# Patient Record
Sex: MALE | Race: WHITE | NOT HISPANIC OR LATINO | Employment: FULL TIME | URBAN - METROPOLITAN AREA
[De-identification: names, ages, dates, MRNs, and addresses within clinical notes are randomized per-mention and may not be internally consistent; named-entity substitution may affect disease eponyms.]

---

## 2020-03-29 ENCOUNTER — TELEPHONE (OUTPATIENT)
Dept: URGENT CARE | Facility: CLINIC | Age: 35
End: 2020-03-29

## 2020-06-01 ENCOUNTER — APPOINTMENT (OUTPATIENT)
Dept: LAB | Facility: HOSPITAL | Age: 35
End: 2020-06-01

## 2020-06-01 ENCOUNTER — OFFICE VISIT (OUTPATIENT)
Dept: INTERNAL MEDICINE | Facility: CLINIC | Age: 35
End: 2020-06-01

## 2020-06-01 VITALS
DIASTOLIC BLOOD PRESSURE: 68 MMHG | OXYGEN SATURATION: 98 % | HEIGHT: 69 IN | TEMPERATURE: 97.2 F | BODY MASS INDEX: 23.11 KG/M2 | WEIGHT: 156 LBS | SYSTOLIC BLOOD PRESSURE: 114 MMHG | RESPIRATION RATE: 16 BRPM | HEART RATE: 74 BPM

## 2020-06-01 DIAGNOSIS — Z13.220 SCREENING FOR LIPID DISORDERS: ICD-10-CM

## 2020-06-01 DIAGNOSIS — Z13.1 SCREENING FOR DIABETES MELLITUS: ICD-10-CM

## 2020-06-01 DIAGNOSIS — Z13.29 SCREENING FOR THYROID DISORDER: ICD-10-CM

## 2020-06-01 DIAGNOSIS — Z00.00 VISIT FOR WELL MAN HEALTH CHECK: Primary | ICD-10-CM

## 2020-06-01 LAB
ALBUMIN SERPL-MCNC: 4.6 G/DL (ref 3.5–5.2)
ALBUMIN/GLOB SERPL: 2.2 G/DL
ALP SERPL-CCNC: 36 U/L (ref 39–117)
ALT SERPL W P-5'-P-CCNC: 16 U/L (ref 1–41)
ANION GAP SERPL CALCULATED.3IONS-SCNC: 12 MMOL/L (ref 5–15)
AST SERPL-CCNC: 28 U/L (ref 1–40)
BASOPHILS # BLD AUTO: 0.04 10*3/MM3 (ref 0–0.2)
BASOPHILS NFR BLD AUTO: 0.9 % (ref 0–1.5)
BILIRUB SERPL-MCNC: 0.8 MG/DL (ref 0.2–1.2)
BUN BLD-MCNC: 14 MG/DL (ref 6–20)
BUN/CREAT SERPL: 12.1 (ref 7–25)
CALCIUM SPEC-SCNC: 9.8 MG/DL (ref 8.6–10.5)
CHLORIDE SERPL-SCNC: 102 MMOL/L (ref 98–107)
CHOLEST SERPL-MCNC: 186 MG/DL (ref 0–200)
CO2 SERPL-SCNC: 28 MMOL/L (ref 22–29)
CREAT BLD-MCNC: 1.16 MG/DL (ref 0.76–1.27)
DEPRECATED RDW RBC AUTO: 38.8 FL (ref 37–54)
EOSINOPHIL # BLD AUTO: 0.28 10*3/MM3 (ref 0–0.4)
EOSINOPHIL NFR BLD AUTO: 6.6 % (ref 0.3–6.2)
ERYTHROCYTE [DISTWIDTH] IN BLOOD BY AUTOMATED COUNT: 11.9 % (ref 12.3–15.4)
GFR SERPL CREATININE-BSD FRML MDRD: 73 ML/MIN/1.73
GLOBULIN UR ELPH-MCNC: 2.1 GM/DL
GLUCOSE BLD-MCNC: 107 MG/DL (ref 65–99)
HBA1C MFR BLD: 4.7 % (ref 4.8–5.6)
HCT VFR BLD AUTO: 46.1 % (ref 37.5–51)
HDLC SERPL-MCNC: 46 MG/DL (ref 40–60)
HGB BLD-MCNC: 15.7 G/DL (ref 13–17.7)
IMM GRANULOCYTES # BLD AUTO: 0.01 10*3/MM3 (ref 0–0.05)
IMM GRANULOCYTES NFR BLD AUTO: 0.2 % (ref 0–0.5)
LDLC SERPL CALC-MCNC: 122 MG/DL (ref 0–100)
LDLC/HDLC SERPL: 2.66 {RATIO}
LYMPHOCYTES # BLD AUTO: 1.51 10*3/MM3 (ref 0.7–3.1)
LYMPHOCYTES NFR BLD AUTO: 35.7 % (ref 19.6–45.3)
MCH RBC QN AUTO: 30.4 PG (ref 26.6–33)
MCHC RBC AUTO-ENTMCNC: 34.1 G/DL (ref 31.5–35.7)
MCV RBC AUTO: 89.3 FL (ref 79–97)
MONOCYTES # BLD AUTO: 0.26 10*3/MM3 (ref 0.1–0.9)
MONOCYTES NFR BLD AUTO: 6.1 % (ref 5–12)
NEUTROPHILS # BLD AUTO: 2.13 10*3/MM3 (ref 1.7–7)
NEUTROPHILS NFR BLD AUTO: 50.5 % (ref 42.7–76)
NRBC BLD AUTO-RTO: 0 /100 WBC (ref 0–0.2)
PLATELET # BLD AUTO: 205 10*3/MM3 (ref 140–450)
PMV BLD AUTO: 10 FL (ref 6–12)
POTASSIUM BLD-SCNC: 4.3 MMOL/L (ref 3.5–5.2)
PROT SERPL-MCNC: 6.7 G/DL (ref 6–8.5)
RBC # BLD AUTO: 5.16 10*6/MM3 (ref 4.14–5.8)
SODIUM BLD-SCNC: 142 MMOL/L (ref 136–145)
T-UPTAKE NFR SERPL: 1.08 TBI (ref 0.8–1.3)
T4 SERPL-MCNC: 5.73 MCG/DL (ref 4.5–11.7)
TRIGL SERPL-MCNC: 88 MG/DL (ref 0–150)
TSH SERPL DL<=0.05 MIU/L-ACNC: 1.28 UIU/ML (ref 0.27–4.2)
VLDLC SERPL-MCNC: 17.6 MG/DL (ref 5–40)
WBC NRBC COR # BLD: 4.23 10*3/MM3 (ref 3.4–10.8)

## 2020-06-01 PROCEDURE — 80061 LIPID PANEL: CPT | Performed by: FAMILY MEDICINE

## 2020-06-01 PROCEDURE — 99385 PREV VISIT NEW AGE 18-39: CPT | Performed by: FAMILY MEDICINE

## 2020-06-01 PROCEDURE — 36415 COLL VENOUS BLD VENIPUNCTURE: CPT | Performed by: FAMILY MEDICINE

## 2020-06-01 PROCEDURE — 83036 HEMOGLOBIN GLYCOSYLATED A1C: CPT | Performed by: FAMILY MEDICINE

## 2020-06-01 PROCEDURE — 85025 COMPLETE CBC W/AUTO DIFF WBC: CPT | Performed by: FAMILY MEDICINE

## 2020-06-01 PROCEDURE — 84436 ASSAY OF TOTAL THYROXINE: CPT | Performed by: FAMILY MEDICINE

## 2020-06-01 PROCEDURE — 84443 ASSAY THYROID STIM HORMONE: CPT | Performed by: FAMILY MEDICINE

## 2020-06-01 PROCEDURE — 84479 ASSAY OF THYROID (T3 OR T4): CPT | Performed by: FAMILY MEDICINE

## 2020-06-01 PROCEDURE — 80053 COMPREHEN METABOLIC PANEL: CPT | Performed by: FAMILY MEDICINE

## 2020-06-01 PROCEDURE — 86900 BLOOD TYPING SEROLOGIC ABO: CPT | Performed by: FAMILY MEDICINE

## 2020-06-01 PROCEDURE — 86901 BLOOD TYPING SEROLOGIC RH(D): CPT | Performed by: FAMILY MEDICINE

## 2020-06-01 NOTE — PROGRESS NOTES
Subjective   Keon Guzman is a 35 y.o. male and is here for a comprehensive physical exam. The patient reports no problems.            Social History:   Social History     Socioeconomic History   • Marital status:      Spouse name: Not on file   • Number of children: Not on file   • Years of education: Not on file   • Highest education level: Not on file   Tobacco Use   • Smoking status: Never Smoker   • Smokeless tobacco: Never Used   Substance and Sexual Activity   • Alcohol use: Never     Frequency: Never       Family History: History reviewed. No pertinent family history.    Past Medical History: History reviewed. No pertinent past medical history.    The following portions of the patient's history were reviewed and updated as appropriate: allergies, current medications, past family history, past medical history, past social history, past surgical history and problem list.    Review of Systems    Review of Systems   Constitutional: Negative for chills and fever.   HENT: Negative for congestion, rhinorrhea, sinus pain and sore throat.    Eyes: Negative for photophobia and visual disturbance.   Respiratory: Negative for cough, chest tightness and shortness of breath.    Cardiovascular: Negative for chest pain and palpitations.   Gastrointestinal: Negative for diarrhea, nausea and vomiting.   Genitourinary: Negative for dysuria, frequency and urgency.   Skin: Negative for rash and wound.   Neurological: Negative for dizziness and syncope.   Psychiatric/Behavioral: Negative for behavioral problems and confusion.       Objective   Physical Exam   Constitutional: He is oriented to person, place, and time. He appears well-developed and well-nourished.   HENT:   Head: Normocephalic and atraumatic.   Right Ear: External ear normal.   Left Ear: External ear normal.   Mouth/Throat: Oropharynx is clear and moist.   Eyes: EOM are normal.   Neck: Normal range of motion. Neck supple.   Cardiovascular: Normal rate,  regular rhythm and normal heart sounds.   Pulmonary/Chest: Effort normal and breath sounds normal. No respiratory distress.   Abdominal: Soft. There is no tenderness. There is no guarding.   Musculoskeletal: Normal range of motion.   Lymphadenopathy:     He has no cervical adenopathy.   Neurological: He is alert and oriented to person, place, and time.   Skin: Skin is warm.   Psychiatric: He has a normal mood and affect. His behavior is normal.   Nursing note and vitals reviewed.      Vitals:    06/01/20 1239   BP: 114/68   Pulse: 74   Resp: 16   Temp: 97.2 °F (36.2 °C)   SpO2: 98%     Body mass index is 23.03 kg/m².    Medications: No current outpatient medications on file.       Assessment/Plan   Healthy male exam.      1. Healthcare Maintenance:  2. Patient Counseling:  --Nutrition: Stressed importance of moderation in sodium/caffeine intake, saturated fat and cholesterol, caloric balance, sufficient intake of fresh fruits, vegetables, fiber, calcium and vit D  --Exercise: Recommended 30 minutes of exercise daily.   --Immunizations reviewed.      Diagnoses and all orders for this visit:    Visit for well man health check  -     CBC & Differential  -     Comprehensive Metabolic Panel  -     ABO/Rh  -     CBC Auto Differential    Screening for diabetes mellitus  -     Hemoglobin A1c    Screening for thyroid disorder  -     Thyroid Panel With TSH    Screening for lipid disorders  -     Lipid Panel With LDL / HDL Ratio        No follow-ups on file.           Dictated utilizing Dragon Voice Recognition Software

## 2020-06-02 LAB
ABO GROUP BLD: NORMAL
RH BLD: POSITIVE

## 2020-06-02 NOTE — PROGRESS NOTES
The labs were reviewed. Please inform patient that labs were normal.  Let patient know that he has A+ blood type.  And that his labs look good.

## 2021-04-16 ENCOUNTER — BULK ORDERING (OUTPATIENT)
Dept: CASE MANAGEMENT | Facility: OTHER | Age: 36
End: 2021-04-16

## 2021-04-16 DIAGNOSIS — Z23 IMMUNIZATION DUE: ICD-10-CM

## 2021-04-21 ENCOUNTER — LAB (OUTPATIENT)
Dept: LAB | Facility: HOSPITAL | Age: 36
End: 2021-04-21

## 2021-04-21 ENCOUNTER — OFFICE VISIT (OUTPATIENT)
Dept: INTERNAL MEDICINE | Facility: CLINIC | Age: 36
End: 2021-04-21

## 2021-04-21 VITALS
HEIGHT: 69 IN | OXYGEN SATURATION: 99 % | HEART RATE: 66 BPM | SYSTOLIC BLOOD PRESSURE: 122 MMHG | BODY MASS INDEX: 24.35 KG/M2 | RESPIRATION RATE: 16 BRPM | DIASTOLIC BLOOD PRESSURE: 80 MMHG | TEMPERATURE: 98.1 F | WEIGHT: 164.4 LBS

## 2021-04-21 DIAGNOSIS — Z13.29 SCREENING FOR THYROID DISORDER: ICD-10-CM

## 2021-04-21 DIAGNOSIS — Z13.220 SCREENING FOR LIPID DISORDERS: ICD-10-CM

## 2021-04-21 DIAGNOSIS — Z13.1 SCREENING FOR DIABETES MELLITUS: ICD-10-CM

## 2021-04-21 DIAGNOSIS — Z00.00 VISIT FOR WELL MAN HEALTH CHECK: Primary | ICD-10-CM

## 2021-04-21 DIAGNOSIS — Z00.00 HEALTHCARE MAINTENANCE: ICD-10-CM

## 2021-04-21 LAB
25(OH)D3 SERPL-MCNC: 58.2 NG/ML (ref 30–100)
ALBUMIN SERPL-MCNC: 4.7 G/DL (ref 3.5–5.2)
ALBUMIN/GLOB SERPL: 1.9 G/DL
ALP SERPL-CCNC: 38 U/L (ref 39–117)
ALT SERPL W P-5'-P-CCNC: 29 U/L (ref 1–41)
ANION GAP SERPL CALCULATED.3IONS-SCNC: 10.7 MMOL/L (ref 5–15)
AST SERPL-CCNC: 40 U/L (ref 1–40)
BASOPHILS # BLD AUTO: 0.04 10*3/MM3 (ref 0–0.2)
BASOPHILS NFR BLD AUTO: 0.7 % (ref 0–1.5)
BILIRUB SERPL-MCNC: 1 MG/DL (ref 0–1.2)
BUN SERPL-MCNC: 24 MG/DL (ref 6–20)
BUN/CREAT SERPL: 19.5 (ref 7–25)
CALCIUM SPEC-SCNC: 10 MG/DL (ref 8.6–10.5)
CHLORIDE SERPL-SCNC: 100 MMOL/L (ref 98–107)
CHOLEST SERPL-MCNC: 244 MG/DL (ref 0–200)
CO2 SERPL-SCNC: 28.3 MMOL/L (ref 22–29)
CREAT SERPL-MCNC: 1.23 MG/DL (ref 0.76–1.27)
DEPRECATED RDW RBC AUTO: 38.1 FL (ref 37–54)
EOSINOPHIL # BLD AUTO: 0.25 10*3/MM3 (ref 0–0.4)
EOSINOPHIL NFR BLD AUTO: 4.3 % (ref 0.3–6.2)
ERYTHROCYTE [DISTWIDTH] IN BLOOD BY AUTOMATED COUNT: 12 % (ref 12.3–15.4)
GFR SERPL CREATININE-BSD FRML MDRD: 67 ML/MIN/1.73
GLOBULIN UR ELPH-MCNC: 2.5 GM/DL
GLUCOSE SERPL-MCNC: 80 MG/DL (ref 65–99)
HBA1C MFR BLD: 4.52 % (ref 4.8–5.6)
HCT VFR BLD AUTO: 49.7 % (ref 37.5–51)
HCV AB SER DONR QL: NORMAL
HDLC SERPL-MCNC: 68 MG/DL (ref 40–60)
HGB BLD-MCNC: 16.6 G/DL (ref 13–17.7)
IMM GRANULOCYTES # BLD AUTO: 0.02 10*3/MM3 (ref 0–0.05)
IMM GRANULOCYTES NFR BLD AUTO: 0.3 % (ref 0–0.5)
LDLC SERPL CALC-MCNC: 164 MG/DL (ref 0–100)
LDLC/HDLC SERPL: 2.37 {RATIO}
LYMPHOCYTES # BLD AUTO: 1.55 10*3/MM3 (ref 0.7–3.1)
LYMPHOCYTES NFR BLD AUTO: 26.4 % (ref 19.6–45.3)
MCH RBC QN AUTO: 30.1 PG (ref 26.6–33)
MCHC RBC AUTO-ENTMCNC: 33.4 G/DL (ref 31.5–35.7)
MCV RBC AUTO: 90.2 FL (ref 79–97)
MONOCYTES # BLD AUTO: 0.45 10*3/MM3 (ref 0.1–0.9)
MONOCYTES NFR BLD AUTO: 7.7 % (ref 5–12)
NEUTROPHILS NFR BLD AUTO: 3.56 10*3/MM3 (ref 1.7–7)
NEUTROPHILS NFR BLD AUTO: 60.6 % (ref 42.7–76)
NRBC BLD AUTO-RTO: 0 /100 WBC (ref 0–0.2)
PLATELET # BLD AUTO: 251 10*3/MM3 (ref 140–450)
PMV BLD AUTO: 9.7 FL (ref 6–12)
POTASSIUM SERPL-SCNC: 4.7 MMOL/L (ref 3.5–5.2)
PROT SERPL-MCNC: 7.2 G/DL (ref 6–8.5)
RBC # BLD AUTO: 5.51 10*6/MM3 (ref 4.14–5.8)
SODIUM SERPL-SCNC: 139 MMOL/L (ref 136–145)
T-UPTAKE NFR SERPL: 1.04 TBI (ref 0.8–1.3)
T4 SERPL-MCNC: 6.04 MCG/DL (ref 4.5–11.7)
TRIGL SERPL-MCNC: 74 MG/DL (ref 0–150)
TSH SERPL DL<=0.05 MIU/L-ACNC: 2.12 UIU/ML (ref 0.27–4.2)
VLDLC SERPL-MCNC: 12 MG/DL (ref 5–40)
WBC # BLD AUTO: 5.87 10*3/MM3 (ref 3.4–10.8)

## 2021-04-21 PROCEDURE — 36415 COLL VENOUS BLD VENIPUNCTURE: CPT | Performed by: FAMILY MEDICINE

## 2021-04-21 PROCEDURE — 84443 ASSAY THYROID STIM HORMONE: CPT | Performed by: FAMILY MEDICINE

## 2021-04-21 PROCEDURE — 84436 ASSAY OF TOTAL THYROXINE: CPT | Performed by: FAMILY MEDICINE

## 2021-04-21 PROCEDURE — 83036 HEMOGLOBIN GLYCOSYLATED A1C: CPT | Performed by: FAMILY MEDICINE

## 2021-04-21 PROCEDURE — 85025 COMPLETE CBC W/AUTO DIFF WBC: CPT | Performed by: FAMILY MEDICINE

## 2021-04-21 PROCEDURE — 86803 HEPATITIS C AB TEST: CPT | Performed by: FAMILY MEDICINE

## 2021-04-21 PROCEDURE — 80061 LIPID PANEL: CPT | Performed by: FAMILY MEDICINE

## 2021-04-21 PROCEDURE — 84479 ASSAY OF THYROID (T3 OR T4): CPT | Performed by: FAMILY MEDICINE

## 2021-04-21 PROCEDURE — 80053 COMPREHEN METABOLIC PANEL: CPT | Performed by: FAMILY MEDICINE

## 2021-04-21 PROCEDURE — 82306 VITAMIN D 25 HYDROXY: CPT | Performed by: FAMILY MEDICINE

## 2021-04-21 PROCEDURE — 99395 PREV VISIT EST AGE 18-39: CPT | Performed by: FAMILY MEDICINE

## 2021-04-21 RX ORDER — FEXOFENADINE HCL 180 MG/1
180 TABLET ORAL DAILY
COMMUNITY
End: 2022-08-15

## 2021-04-21 RX ORDER — FLUTICASONE PROPIONATE 50 MCG
2 SPRAY, SUSPENSION (ML) NASAL DAILY
COMMUNITY
End: 2022-08-15

## 2021-04-21 NOTE — PROGRESS NOTES
Subjective   Keon Guzman is a 36 y.o. male and is here for a comprehensive physical exam. The patient reports no problems.            Social History:   Social History     Socioeconomic History   • Marital status:      Spouse name: Not on file   • Number of children: Not on file   • Years of education: Not on file   • Highest education level: Not on file   Tobacco Use   • Smoking status: Never Smoker   • Smokeless tobacco: Never Used   Vaping Use   • Vaping Use: Never used   Substance and Sexual Activity   • Alcohol use: Never   • Drug use: Never       Family History: No family history on file.    Past Medical History: No past medical history on file.    The following portions of the patient's history were reviewed and updated as appropriate: allergies, current medications, past family history, past medical history, past social history, past surgical history and problem list.    Review of Systems    Review of Systems   Constitutional: Negative for chills and fever.   HENT: Positive for postnasal drip and sore throat. Negative for congestion, rhinorrhea and sinus pain.    Eyes: Negative for photophobia and visual disturbance.   Respiratory: Negative for cough, chest tightness and shortness of breath.    Cardiovascular: Negative for chest pain and palpitations.   Gastrointestinal: Negative for diarrhea, nausea and vomiting.   Genitourinary: Negative for dysuria, frequency and urgency.   Skin: Negative for rash and wound.   Neurological: Negative for dizziness and syncope.   Psychiatric/Behavioral: Negative for behavioral problems and confusion.       Objective   Physical Exam  Vitals and nursing note reviewed.   Constitutional:       Appearance: He is well-developed.   HENT:      Head: Normocephalic and atraumatic.      Right Ear: External ear normal.      Left Ear: External ear normal.   Cardiovascular:      Rate and Rhythm: Normal rate and regular rhythm.      Heart sounds: Normal heart sounds.    Pulmonary:      Effort: Pulmonary effort is normal. No respiratory distress.      Breath sounds: Normal breath sounds.   Abdominal:      Palpations: Abdomen is soft.      Tenderness: There is no abdominal tenderness. There is no guarding.   Musculoskeletal:         General: Normal range of motion.      Cervical back: Normal range of motion and neck supple.   Lymphadenopathy:      Cervical: No cervical adenopathy.   Skin:     General: Skin is warm.   Neurological:      Mental Status: He is alert and oriented to person, place, and time.   Psychiatric:         Behavior: Behavior normal.         Vitals:    04/21/21 1028   BP: 122/80   Pulse: 66   Resp: 16   Temp: 98.1 °F (36.7 °C)   SpO2: 99%     Body mass index is 24.28 kg/m².    Medications:   Current Outpatient Medications:   •  fexofenadine (Allegra Allergy) 180 MG tablet, Take 180 mg by mouth Daily., Disp: , Rfl:   •  fluticasone (FLONASE) 50 MCG/ACT nasal spray, 2 sprays into the nostril(s) as directed by provider Daily., Disp: , Rfl:        Assessment/Plan   Healthy male exam.      1. Healthcare Maintenance:  2. Patient Counseling:  --Nutrition: Stressed importance of moderation in sodium/caffeine intake, saturated fat and cholesterol, caloric balance, sufficient intake of fresh fruits, vegetables, fiber, calcium and vit D  --Exercise: Recommended 30 minutes of exercise daily.   --Immunizations reviewed.      Diagnoses and all orders for this visit:    Visit for well man health check    Screening for thyroid disorder    Screening for diabetes mellitus    Screening for lipid disorders    Healthcare maintenance  -     CBC & Differential  -     Comprehensive Metabolic Panel  -     Hemoglobin A1c  -     Thyroid Panel With TSH  -     Lipid Panel With LDL / HDL Ratio  -     Vitamin D 25 Hydroxy  -     Hepatitis C Antibody    Other orders  -     fexofenadine (Allegra Allergy) 180 MG tablet; Take 180 mg by mouth Daily.  -     fluticasone (FLONASE) 50 MCG/ACT nasal  spray; 2 sprays into the nostril(s) as directed by provider Daily.        No follow-ups on file.           Dictated utilizing Dragon Voice Recognition Software

## 2021-04-22 NOTE — PROGRESS NOTES
Please inform the patient of the following abnormal results. Cholesterol is high, needs to diet and exercise.

## 2021-10-12 ENCOUNTER — OFFICE VISIT (OUTPATIENT)
Dept: INTERNAL MEDICINE | Facility: CLINIC | Age: 36
End: 2021-10-12

## 2021-10-12 VITALS
OXYGEN SATURATION: 99 % | HEART RATE: 80 BPM | WEIGHT: 170.2 LBS | HEIGHT: 69 IN | BODY MASS INDEX: 25.21 KG/M2 | DIASTOLIC BLOOD PRESSURE: 60 MMHG | SYSTOLIC BLOOD PRESSURE: 106 MMHG

## 2021-10-12 DIAGNOSIS — M54.42 CHRONIC LEFT-SIDED LOW BACK PAIN WITH LEFT-SIDED SCIATICA: ICD-10-CM

## 2021-10-12 DIAGNOSIS — M77.11 LATERAL EPICONDYLITIS OF RIGHT ELBOW: Primary | ICD-10-CM

## 2021-10-12 DIAGNOSIS — G89.29 CHRONIC LEFT-SIDED LOW BACK PAIN WITH LEFT-SIDED SCIATICA: ICD-10-CM

## 2021-10-12 PROCEDURE — 99214 OFFICE O/P EST MOD 30 MIN: CPT | Performed by: FAMILY MEDICINE

## 2021-10-12 RX ORDER — CYCLOBENZAPRINE HCL 10 MG
10 TABLET ORAL 3 TIMES DAILY PRN
Qty: 60 TABLET | Refills: 2 | Status: SHIPPED | OUTPATIENT
Start: 2021-10-12 | End: 2022-08-15

## 2021-10-12 RX ORDER — DICLOFENAC SODIUM 20 MG/G
1 SOLUTION TOPICAL 2 TIMES DAILY PRN
Qty: 112 G | Refills: 11 | Status: SHIPPED | OUTPATIENT
Start: 2021-10-12 | End: 2022-08-15

## 2021-10-12 RX ORDER — MELOXICAM 10 MG/1
1 CAPSULE ORAL DAILY
Qty: 30 CAPSULE | Refills: 3 | Status: SHIPPED | OUTPATIENT
Start: 2021-10-12 | End: 2021-10-15

## 2021-10-12 NOTE — PROGRESS NOTES
"Chief Complaint  low back pain for about 3 months    Subjective          Keon Guzman presents to [facility]  History of Present Illness    Patient presents at today's office visit for a 3-month history of having lateral epicondylitis of the right elbow.  He does note that he is left-handed.  He does note that he exercises and works out regularly.  He does not know any particular activities that he could have done that could have triggered this.  However he is tender upon the epicondyle.    Patient also notes that he has some back pain.  Patient states that the pain has been on the low back, with some sciatica pain that radiates down his left buttock and left leg.  He denies any particular typically trigger this.  He does not exercise regularly.  He states this also been going on for almost 3 months history as well.  He does not believe in any particular activity could have triggered both of the pains that he currently has with the elbow along with the back.  He states that he recently just finished a steroid Dosepak, however he did not get full recovery of either one of his conditions.    Objective   Vital Signs:   /60 (BP Location: Left arm, Patient Position: Sitting, Cuff Size: Large Adult)   Pulse 80   Ht 175.3 cm (69\")   Wt 77.2 kg (170 lb 3.2 oz)   SpO2 99%   BMI 25.13 kg/m²     Physical Exam  Vitals and nursing note reviewed.   Constitutional:       Appearance: He is well-developed.   HENT:      Head: Normocephalic and atraumatic.   Musculoskeletal:        Arms:       Cervical back: Normal range of motion and neck supple.        Back:    Neurological:      Mental Status: He is alert and oriented to person, place, and time.   Psychiatric:         Behavior: Behavior normal.        Result Review :                 Assessment and Plan    Diagnoses and all orders for this visit:    1. Lateral epicondylitis of right elbow (Primary)  -     Diclofenac Sodium (Pennsaid) 2 " % solution; Apply 1 application topically 2 (Two) Times a Day As Needed (pain).  Dispense: 112 g; Refill: 11    2. Chronic left-sided low back pain with left-sided sciatica  -     Meloxicam 10 MG capsule; Take 1 tablet by mouth Daily.  Dispense: 30 capsule; Refill: 3  -     cyclobenzaprine (FLEXERIL) 10 MG tablet; Take 1 tablet by mouth 3 (Three) Times a Day As Needed for Muscle Spasms.  Dispense: 60 tablet; Refill: 2      Start patient on meloxicam.  We will also start him on Flexeril.  He can also do Pennsaid for the lateral epicondylitis.  He was signs symptoms do not improve, may benefit from seeing sports medicine orthopedics.  Also discussed with him about lifestyle modifying activities.      Follow Up   No follow-ups on file.  Patient was given instructions and counseling regarding his condition or for health maintenance advice. Please see specific information pulled into the AVS if appropriate.

## 2021-10-14 DIAGNOSIS — M54.42 CHRONIC LEFT-SIDED LOW BACK PAIN WITH LEFT-SIDED SCIATICA: ICD-10-CM

## 2021-10-14 DIAGNOSIS — G89.29 CHRONIC LEFT-SIDED LOW BACK PAIN WITH LEFT-SIDED SCIATICA: ICD-10-CM

## 2021-10-15 RX ORDER — MELOXICAM 10 MG/1
CAPSULE ORAL
Qty: 30 CAPSULE | Refills: 3 | Status: SHIPPED | OUTPATIENT
Start: 2021-10-15 | End: 2022-08-15

## 2021-11-15 ENCOUNTER — OFFICE VISIT (OUTPATIENT)
Dept: INTERNAL MEDICINE | Facility: CLINIC | Age: 36
End: 2021-11-15

## 2021-11-15 VITALS
WEIGHT: 174.9 LBS | HEART RATE: 94 BPM | RESPIRATION RATE: 16 BRPM | DIASTOLIC BLOOD PRESSURE: 74 MMHG | TEMPERATURE: 97.1 F | BODY MASS INDEX: 25.9 KG/M2 | HEIGHT: 69 IN | OXYGEN SATURATION: 99 % | SYSTOLIC BLOOD PRESSURE: 126 MMHG

## 2021-11-15 DIAGNOSIS — M54.42 CHRONIC LEFT-SIDED LOW BACK PAIN WITH LEFT-SIDED SCIATICA: Primary | ICD-10-CM

## 2021-11-15 DIAGNOSIS — G89.29 CHRONIC LEFT-SIDED LOW BACK PAIN WITH LEFT-SIDED SCIATICA: Primary | ICD-10-CM

## 2021-11-15 DIAGNOSIS — M77.11 LATERAL EPICONDYLITIS OF RIGHT ELBOW: ICD-10-CM

## 2021-11-15 PROCEDURE — 99214 OFFICE O/P EST MOD 30 MIN: CPT | Performed by: FAMILY MEDICINE

## 2021-11-15 NOTE — PROGRESS NOTES
"Chief Complaint  Back Pain and Elbow Pain    Subjective          Keon Guzman presents to [facility]  History of Present Illness    Patient still has some right elbow pain.  Patient states that if he lays off doing any work, he does feel like is getting better.  He will occasionally use the pennsaid.  We discussed at today's office visit that he may benefit from seeing orthopedist.    Patient continues to have back pain, but does note that has gotten slightly better.  He stopped taking Flexeril due to constipation.  So location takes meloxicam as needed.    Objective   Vital Signs:   /74   Pulse 94   Temp 97.1 °F (36.2 °C) (Infrared)   Resp 16   Ht 175.3 cm (69\")   Wt 79.3 kg (174 lb 14.4 oz)   SpO2 99%   BMI 25.83 kg/m²     Physical Exam  Vitals and nursing note reviewed.   Constitutional:       Appearance: He is well-developed.   HENT:      Head: Normocephalic and atraumatic.   Musculoskeletal:      Cervical back: Normal range of motion and neck supple.   Neurological:      Mental Status: He is alert and oriented to person, place, and time.   Psychiatric:         Behavior: Behavior normal.        Result Review :                 Assessment and Plan    Diagnoses and all orders for this visit:    1. Chronic left-sided low back pain with left-sided sciatica (Primary)  -     Ambulatory Referral to Physical Therapy Evaluate and treat  -     We will refer to PT.    2. Lateral epicondylitis of right elbow  -     Ambulatory Referral to Orthopedic Surgery  -     We will refer to orthopedics.  Continue NSAID use.        Follow Up   No follow-ups on file.  Patient was given instructions and counseling regarding his condition or for health maintenance advice. Please see specific information pulled into the AVS if appropriate.       "

## 2021-12-14 ENCOUNTER — HOSPITAL ENCOUNTER (OUTPATIENT)
Dept: PHYSICAL THERAPY | Facility: HOSPITAL | Age: 36
Setting detail: THERAPIES SERIES
Discharge: HOME | End: 2021-12-14

## 2021-12-14 DIAGNOSIS — M54.50 LOW BACK PAIN, UNSPECIFIED BACK PAIN LATERALITY, UNSPECIFIED CHRONICITY, UNSPECIFIED WHETHER SCIATICA PRESENT: Primary | ICD-10-CM

## 2021-12-14 DIAGNOSIS — Z74.09 IMPAIRED MOBILITY: ICD-10-CM

## 2021-12-14 PROCEDURE — 97161 PT EVAL LOW COMPLEX 20 MIN: CPT | Performed by: PHYSICAL THERAPIST

## 2021-12-14 PROCEDURE — 97110 THERAPEUTIC EXERCISES: CPT | Performed by: PHYSICAL THERAPIST

## 2021-12-14 NOTE — THERAPY EVALUATION
Outpatient Physical Therapy Ortho Initial Evaluation  [facility]     Patient Name: Keon Guzman  : 1985  MRN: [id number]  Today's Date: 2021      Visit Date: 2021    Patient Active Problem List   Diagnosis   • Chronic left-sided low back pain with left-sided sciatica   • Lateral epicondylitis of right elbow        No past medical history on file.     No past surgical history on file.    Visit Dx:     ICD-10-CM ICD-9-CM   1. Low back pain, unspecified back pain laterality, unspecified chronicity, unspecified whether sciatica present  M54.50 724.2   2. Impaired mobility  Z74.09 799.89          Patient History     Row Name 21 0900             History    Chief Complaint Pain  -GJ      Type of Pain Back pain  -GJ      Date Current Problem(s) Began --  4-5 months  -GJ      Brief Description of Current Complaint Mr. Guzman is a 37 y/o male. He reports 4-5 month hx of L sided, very specific pain. Not really changing, not getting better or worse. Never has pain below the buttock. Point of pain Just lateral of L sacral border.  He denies N/T BLE, denies pain BLE, denies change in bowel/bladder habits and denies difficulty sleeping.  Possible [place] was a long trip.  He has not had imaging to date. He has tried chiropractic with DDN, cupping, manipulations, which did not help.  Sitting seems to be aggravating. He is active and runs/cycles several times a week. He reports neither of these activities seem to bother him.   He denies falls/trauma.  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 --  nothing yet  -GJ              Pain     Pain Location Back  -GJ      Pain Frequency Constant/continuous  -GJ      What Performance  Factors Make the Current Problem(s) WORSE? sitting  -GJ              Fall Risk Assessment    Any falls in the past year: No  -GJ              Daily Activities    Primary Language English  -GJ      Teaching needs identified Home Exercise Program; Management of Condition  -GJ      Barriers to learning None  -GJ      Pt Participated in POC and Goals Yes  -GJ            User Key  (r) = Recorded By, (t) = Taken By, (c) = Cosigned By    Initials Name Provider Type    GJ Alpesh Ramírez, PT Physical Therapist                 PT Ortho     Row Name 12/14/21 1200       Posture/Observations    Alignment Options Rounded shoulders  -GJ    Rounded Shoulders Mild  -GJ       Quarter Clearing    Quarter Clearing Lower Quarter Clearing  -GJ       DTR- Lower Quarter Clearing    Patellar tendon (L2-4) Bilateral:; 2- Normal response  -GJ    Achilles tendon (S1-2) Bilateral:; 2- Normal response  -GJ       Neural Tension Signs- Lower Quarter Clearing    Slump Bilateral:; Negative  -GJ    SLR Bilateral:; Negative  -GJ    Prone knee flexion Bilateral:; Negative  -GJ       Myotomal Screen- Lower Quarter Clearing    Hip flexion (L2) Bilateral:; 4+ (Good +)  -GJ    Knee extension (L3) Bilateral:; 5 (Normal)  -GJ    Ankle DF (L4) Bilateral:; 5 (Normal)  -GJ    Great toe extension (L5) Bilateral:; 5 (Normal)  -GJ    Ankle PF (S1) Bilateral:; 5 (Normal)  -GJ    Knee flexion (S2) Bilateral:; 5 (Normal)  -GJ       Lumbar ROM Screen- Lower Quarter Clearing    Lumbar Flexion Impaired  limited by 25%, tight, no pain  -GJ    Lumbar Extension Impaired  normal AROM, pain on L at end range  -GJ    Lumbar Lateral Flexion Normal  -GJ    Lumbar Rotation Normal  -GJ       SI/Hip Screen- Lower Quarter Clearing    Clarence's/Miguel's test Bilateral:; Negative  -GJ    Posterior thigh sheer Bilateral:; Negative  -GJ    Pain in Trip's area Bilateral:; Negative  -GJ       Special Tests/Palpation    Special Tests/Palpation Lumbar/SI; Hip  -GJ       Lumbar/SI Special  Tests    Thigh Thrust/Posterior Shear (SI Dysfunction) Bilateral:; Negative  -GJ    Sacral Spring Test (SI Dysfunction) Negative  -GJ       Lumbosacral Palpation    Lumbosacral Palpation? Yes  -GJ    Piriformis Left:; Tender; Guarded/taut; Trigger point  -GJ    Quadratus Lumborum Left:; Guarded/taut  -GJ    Erector Spinae (Paraspinals) Bilateral:; Guarded/taut  -GJ       Hip/Thigh Palpation    Hip/Thigh Palpation? Yes  -GJ    Gluteus Medius Left:; Guarded/taut; Trigger point  -GJ       Hip Special Tests    Trendelenberg sign (gluteus medius weakness) Bilateral:; Negative  -GJ    CORY (hip vs SI pathology) Bilateral:; Negative  -GJ    Ely’s test (rectus femoris tightness) Bilateral:; Positive  -GJ    Hip scour test (labral vs hip pathology) Bilateral:; Negative  -GJ       General ROM    GENERAL ROM COMMENTS bilateral hip, knee, ankle PROM grossly equal/no deficits  -GJ       MMT (Manual Muscle Testing)    Rt Lower Ext Rt Hip Extension; Rt Hip ABduction  -GJ    Lt Lower Ext Lt Hip ABduction; Lt Hip Extension  -GJ       MMT Right Lower Ext    Rt Hip Extension MMT, Gross Movement (5/5) normal  -GJ    Rt Hip ABduction MMT, Gross Movement (5/5) normal  -GJ       MMT Left Lower Ext    Lt Hip Extension MMT, Gross Movement (5/5) normal  -GJ    Lt Hip ABduction MMT, Gross Movement (5/5) normal  -GJ       Flexibility    Flexibility Tested? Lower Extremity; Upper Extremity  -GJ       Upper Extremity Flexibility    Latissimus Dorsi Bilateral:; Moderately limited  -GJ       Lower Extremity Flexibility    Hamstrings Left:; Moderately limited; Right:; Mildly limited  -GJ    Hip Flexors Bilateral:; Mildly limited  -GJ    Hip External Rotators Bilateral:; Mildly limited; Moderately limited  -GJ    Hip Internal Rotators Bilateral:; Mildly limited; Moderately limited  -GJ    Quadratus Lumborum Left:; Mildly limited; Moderately limited  -GJ       Balance Skills Training    SLS >/= 10 sec B  -GJ          User Key  (r) = Recorded By, (t)  = Taken By, (c) = Cosigned By    Initials Name Provider Type    Alpesh Guevara, PT Physical Therapist                            Therapy Education  Education Details: discussed dx, px, poc, discussed anatomy of the  spine and physiology of healing, discussed realistic expectations and time frames for therapy. Encoruaged pt to hold on running and cycling for 2 weeks, ok to rid recumbent bike without excessive resistance. Discussed postural considerations,, discussed risks/benefits of DDN, answered questions  Given: HEP, Symptoms/condition management, Pain management, Posture/body mechanics, Mobility training  Program: New  How Provided: Verbal, Demonstration, Written  Provided to: Patient  Level of Understanding: Teach back education performed, Verbalized, Demonstrated      PT OP Goals     Row Name 12/14/21 0900          PT Short Term Goals    STG Date to Achieve 01/14/22  -     STG 1 pt. to be I with initial HEP to facilitate self management of their condition  -     STG 1 Progress New  -     STG 2 pt. to be educated in/verbalize understanding of the importance of posture/ergonomics in association with their condition to facilitate self management of their condition  -     STG 2 Progress New  [facility]            Long Term Goals    LTG Date to Achieve 03/18/22  -     LTG 1 pt. to be I with advanced HEP to facilitate self management of their condition  -     LTG 1 Progress New  -     LTG 2 pt. to report anOswestry </= 8% to demonstrate decreased level of perceived disability  -     LTG 2 Progress New  -     LTG 3 pt to report absence of L sided low back pain to facilitate ease with performing work/fitness activities  -     LTG 3 Progress New  -     LTG 4 pt to demonstrate L spine AROM extension without L sided LBP to facilitate ease with performing daily activities  -     LTG 4 Progress New  [facility]            Time Calculation    PT Goal Re-Cert Due Date 03/18/22  -           User Key  (r) =  Recorded By, (t) = Taken By, (c) = Cosigned By    Initials Name Provider Type    Alpesh Guevara, PT Physical Therapist                 PT Assessment/Plan     Row Name 12/14/21 1030          PT Assessment    Functional Limitations Limitation in home management; Performance in leisure activities; Performance in sport activities; Performance in work activities  -     Impairments Impaired flexibility; Impaired muscle length; Impaired muscle power; Pain; Poor body mechanics; Posture  -     Assessment Comments Mr. Guzman is a 35 y/o male. He reports 4-5 month hx of L sided, very specific pain. Not really changing, not getting better or worse. Never has pain below the buttock. Point of pain Just lateral of L sacral border.  He denies N/T BLE, denies pain BLE, denies change in bowel/bladder habits and denies difficulty sleeping.  Possible [place] was a long trip.  He has not had imaging to date. He has tried chiropractic with DDN, cupping, manipulations, which did not help.  Sitting seems to be aggravating. He is active and runs/cycles several times a week. He reports neither of these activities seem to bother him.   He denies falls/trauma.  Mr. Guzman demonstrate mild forward shoulder posture. He demonstrates strong myotomes throughout bilateral lower quarter. He demonstrates near full AROM in all planes of the L spine with pain noted in ext at end range.  He does demonstrate taut bands in L piriformis tissue. He demonstrates tight hip rotators, hip flexors, L HS>R, and bilateral lattisimus dorsi tissues.  Hip PROM all planes is normal, equal and non-contributory.  (-) neuro tension testing. He reports an osweestry score of 16% scored 0-100, 0% represents no perceived disability.  Mr. 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  Aggravating/Personal factors affecting recovery include,   but are not limited to, limited response to other modalities.  Mr. Guzman may benefit from skilled physical therapy to address the above impairments  -GJ     Please refer to paper survey for additional self-reported information Yes  -GJ     Rehab Potential Excellent  -GJ     Patient/caregiver participated in establishment of treatment plan and goals Yes  -GJ     Patient would benefit from skilled therapy intervention Yes  -GJ            PT Plan    PT Frequency 1x/week; 2x/week  -GJ     Predicted Duration of Therapy Intervention (PT) 10 visists  -GJ     Planned CPT's? PT EVAL LOW COMPLEXITY: 73357; PT RE-EVAL: 96610; PT THER PROC EA 15 MIN: 09023; PT THER ACT EA 15 MIN: 71816; PT MANUAL THERAPY EA 15 MIN: 85128; PT NEUROMUSC RE-EDUCATION EA 15 MIN: 42464; PT HOT OR COLD PACK TREAT MCARE; PT ELECTRICAL STIM UNATTEND: ; PT TRACTION LUMBAR: 60404  -GJ     PT Plan Comments ?DDN L posterior hip girde tissue at some point.  Likely, warm up on bike v.s eliptical, hip girdle strengthening, core strengthenig (?HS curl, planks forward/lateral, SL hip abd, lateral walks, monster walks, stretch hip girdle. Manual to L posterior/lateral hip girdle tissue as needed  -GJ           User Key  (r) = Recorded By, (t) = Taken By, (c) = Cosigned By    Initials Name Provider Type    GJ Alpesh Ramírez, PT Physical Therapist                   OP Exercises     Row Name 12/14/21 1223 12/14/21 1000          Total Minutes    11277 - PT Therapeutic Exercise Minutes 12  -GJ --            Exercise 1    Exercise Name 1 -- LTR  -GJ     Cueing 1 -- Verbal; Demo  -GJ     Reps 1 -- 10  -GJ     Time 1 -- 5s  -GJ            Exercise 2    Exercise Name 2 -- piriformis stretch  -GJ     Cueing 2 -- Verbal; Demo  -GJ     Reps 2 -- 3  -GJ     Time 2 -- 20 s  -GJ            Exercise 3    Exercise Name 3 -- PPT  -GJ     Cueing 3 -- Verbal; Demo  -GJ     Reps 3 -- 3  -GJ     Time 3 -- 20 s  -GJ            Exercise 4    Exercise Name 4 -- seatead HS  stretch  -GJ     Cueing 4 -- Verbal; Demo  -GJ     Reps 4 -- 3  -GJ     Time 4 -- 20 s  -GJ            Exercise 5    Exercise Name 5 -- lateral prayer stretch  -GJ     Cueing 5 -- Verbal; Demo  -GJ     Reps 5 -- 3  -GJ     Time 5 -- 20 s  -GJ            Exercise 6    Exercise Name 6 -- standinig quad  -GJ     Cueing 6 -- Verbal; Demo  -GJ     Reps 6 -- 3  -GJ     Time 6 -- 20 s  -GJ           User Key  (r) = Recorded By, (t) = Taken By, (c) = Cosigned By    Initials Name Provider Type    Alpesh Guevara, PT Physical Therapist                              Outcome Measure Options: Modifed Owestry  Modified Oswestry  Modified Oswestry Score/Comments: 16%      Time Calculation:     Start Time: 0915  Stop Time: 1000  Time Calculation (min): 45 min  Timed Charges  60822 - PT Therapeutic Exercise Minutes: 12  Total Minutes  Timed Charges Total Minutes: 12   Total Minutes: 12     Therapy Charges for Today     Code Description Service Date Service Provider Modifiers Qty    95010276772  PT THER PROC EA 15 MIN 12/14/2021 Alpesh Ramírez, PT GP 1    [id number]  PT EVAL LOW COMPLEXITY 2 12/14/2021 Alpesh Ramírez, PT GP 1          PT G-Codes  Outcome Measure Options: Modifed Owestry  Modified Oswestry Score/Comments: 16%         Alpesh Ramírez, PT  12/14/2021

## 2021-12-17 ENCOUNTER — HOSPITAL ENCOUNTER (OUTPATIENT)
Dept: PHYSICAL THERAPY | Facility: HOSPITAL | Age: 36
Setting detail: THERAPIES SERIES
Discharge: HOME | End: 2021-12-17

## 2021-12-17 DIAGNOSIS — M54.50 LOW BACK PAIN, UNSPECIFIED BACK PAIN LATERALITY, UNSPECIFIED CHRONICITY, UNSPECIFIED WHETHER SCIATICA PRESENT: Primary | ICD-10-CM

## 2021-12-17 DIAGNOSIS — Z74.09 IMPAIRED MOBILITY: ICD-10-CM

## 2021-12-17 PROCEDURE — 97140 MANUAL THERAPY 1/> REGIONS: CPT

## 2021-12-17 PROCEDURE — 97110 THERAPEUTIC EXERCISES: CPT

## 2021-12-17 NOTE — THERAPY TREATMENT NOTE
Outpatient Physical Therapy Ortho Treatment Note  [facility]     Patient Name: Keon Guzman  : 1985  MRN: [id number]  Today's Date: 2021      Visit Date: 2021    Visit Dx:    ICD-10-CM ICD-9-CM   1. Low back pain, unspecified back pain laterality, unspecified chronicity, unspecified whether sciatica present  M54.50 724.2   2. Impaired mobility  Z74.09 799.89       Patient Active Problem List   Diagnosis   • Chronic left-sided low back pain with left-sided sciatica   • Lateral epicondylitis of right elbow        No past medical history on file.     No past surgical history on file.                     PT Assessment/Plan     Row Name 21 1216          PT Assessment    Assessment Comments Keon returns to clinic for first follow up from initial evaluation, no change in symptoms to date. Reviewed stretches and educated on purpose of static vs. dynamic stretches as well as initiated hip girdle isometrics with side planks, hip iso at wall, and bridges. pt. demo musccular fatigue and decreased hip girdle strength in side plank requiring tactile cueing for alignment. Pt. will continnue to benefit from skilled PT.  -            PT Plan    PT Plan Comments consider HS bridge, SL RDL? possible continue manual vs. further strengthening? update HEP  -           User Key  (r) = Recorded By, (t) = Taken By, (c) = Cosigned By    Initials Name Provider Type     Christiana Torres, PT Physical Therapist                   OP Exercises     Row Name 21 1100             Total Minutes    74379 - PT Therapeutic Exercise Minutes 32  -MH      32547 - PT Manual Therapy Minutes 8  -MH              Exercise 1    Exercise Name 1 LTR  -      Cueing 1 Verbal; Demo  -      Reps 1 10  -MH      Time 1 5s  -MH              Exercise 2    Exercise Name 2 piriformis stretch  -      Cueing 2 Verbal; Demo  -MH      Reps 2 3  -MH      Time 2 20 s  -MH              Exercise 3    Exercise Name 3 PPT  -       Cueing 3 Verbal; Demo  -MH      Reps 3 10  -MH      Time 3 5sec  -MH              Exercise 4    Exercise Name 4 seated HS stretch  -MH      Cueing 4 Verbal; Demo  -MH      Reps 4 3  -MH      Time 4 20 s  -MH              Exercise 5    Exercise Name 5 lateral prayer stretch  -MH      Cueing 5 Verbal; Demo  -MH      Reps 5 3  -MH      Time 5 20 s  -MH              Exercise 6    Exercise Name 6 standing quad  -MH      Cueing 6 Verbal; Demo  -MH      Reps 6 3  -MH      Time 6 20 s  -MH              Exercise 7    Exercise Name 7 rec bike  -MH      Cueing 7 Verbal; Demo  -MH      Time 7 5 min  -MH      Additional Comments L1-L4 seat 8  -MH              Exercise 8    Exercise Name 8 fig 4 bridge  -MH      Cueing 8 Verbal; Demo  -MH      Reps 8 10e  -MH      Time 8 set with PPT  -MH              Exercise 9    Exercise Name 9 plank  -MH      Cueing 9 Verbal; Demo  -MH      Reps 9 3  -MH      Time 9 30sec  -MH              Exercise 10    Exercise Name 10 side plank  -MH      Cueing 10 Verbal; Demo  -MH      Reps 10 3e  -MH      Time 10 30sec  -MH              Exercise 11    Exercise Name 11 glute med iso at wall  -MH      Cueing 11 Verbal; Demo  -MH      Reps 11 8e  -MH      Time 11 5sec  -MH            User Key  (r) = Recorded By, (t) = Taken By, (c) = Cosigned By    Initials Name Provider Type    MH Christiana Torres, PT Physical Therapist                         Manual Rx (last 36 hours)     Manual Treatments     Row Name 12/17/21 1100             Total Minutes    18564 - PT Manual Therapy Minutes 8  -MH              Manual Rx 1    Manual Rx 1 Location STM L lateral hip girdle/piriformis  -MH      Manual Rx 1 Type S/L on R with towel roll  -MH            User Key  (r) = Recorded By, (t) = Taken By, (c) = Cosigned By    Initials Name Provider Type    MH Christiana Torres, PT Physical Therapist                 PT OP Goals     Row Name 12/17/21 1200          PT Short Term Goals    STG Date to Achieve 01/14/22  -MH     STG 1  pt. to be I with initial HEP to facilitate self management of their condition  -     STG 1 Progress Ongoing  [facility]     STG 2 pt. to be educated in/verbalize understanding of the importance of posture/ergonomics in association with their condition to facilitate self management of their condition  -     STG 2 Progress [facility]            Long Term Goals    LTG Date to Achieve 03/18/22  -     LTG 1 pt. to be I with advanced HEP to facilitate self management of their condition  -     LTG 1 Progress Ongoing  [facility]     LTG 2 pt. to report anOswestry </= 8% to demonstrate decreased level of perceived disability  -     LTG 2 Progress Ongoing  [facility]     LTG 3 pt to report absence of L sided low back pain to facilitate ease with performing work/fitness activities  -     LTG 3 Progress [facility]     LTG 4 pt to demonstrate L spine AROM extension without L sided LBP to facilitate ease with performing daily activities  -     LTG 4 Progress [facility]           User Key  (r) = Recorded By, (t) = Taken By, (c) = Cosigned By    Initials Name Provider Type     Christiana Torres PT Physical Therapist                Therapy Education  Education Details: purpose of static vs. dynamic stretching and appropriate use of stretches/isometrics  Given: Symptoms/condition management  Program: Reinforced  How Provided: Verbal, Demonstration  Provided to: Patient  Level of Understanding: Verbalized, Demonstrated              Time Calculation:   Start Time: 1131  Stop Time: 1212  Time Calculation (min): 41 min  Total Timed Code Minutes- PT: 40 minute(s)  Timed Charges  56603 - PT Therapeutic Exercise Minutes: 32  95316 - PT Manual Therapy Minutes: 8  Total Minutes  Timed Charges Total Minutes: 40   Total Minutes: 40  Therapy Charges for Today     Code Description Service Date Service Provider Modifiers Qty    95163345377 HC PT MANUAL THERAPY EA 15 MIN 12/17/2021 Christiana Torres, PT GP 1    [id number] HC PT THER PROC EA 15 MIN  12/17/2021 Christiana Torres, PT GP 2                    Christiana Torres, PT  12/17/2021

## 2021-12-28 ENCOUNTER — HOSPITAL ENCOUNTER (OUTPATIENT)
Dept: PHYSICAL THERAPY | Facility: HOSPITAL | Age: 36
Setting detail: THERAPIES SERIES
Discharge: HOME | End: 2021-12-28

## 2021-12-28 DIAGNOSIS — Z74.09 IMPAIRED MOBILITY: ICD-10-CM

## 2021-12-28 DIAGNOSIS — M54.50 LOW BACK PAIN, UNSPECIFIED BACK PAIN LATERALITY, UNSPECIFIED CHRONICITY, UNSPECIFIED WHETHER SCIATICA PRESENT: Primary | ICD-10-CM

## 2021-12-28 PROCEDURE — 97140 MANUAL THERAPY 1/> REGIONS: CPT

## 2021-12-28 PROCEDURE — 97110 THERAPEUTIC EXERCISES: CPT

## 2021-12-28 NOTE — THERAPY TREATMENT NOTE
Outpatient Physical Therapy Ortho Treatment Note  [facility]     Patient Name: Keon Guzman  : 1985  MRN: [id number]  Today's Date: 2021      Visit Date: 2021    Visit Dx:    ICD-10-CM ICD-9-CM   1. Low back pain, unspecified back pain laterality, unspecified chronicity, unspecified whether sciatica present  M54.50 724.2   2. Impaired mobility  Z74.09 799.89       Patient Active Problem List   Diagnosis   • Chronic left-sided low back pain with left-sided sciatica   • Lateral epicondylitis of right elbow        No past medical history on file.     No past surgical history on file.                     PT Assessment/Plan     Row Name 21 1300          PT Assessment    Assessment Comments Keon returns to PT with reports of no improvement in LBP/L hip pain from previous session despite compliance with HEP. He continues to demonstrate trigger points within L glute medius/piriformis. If patient returns to PT without significant reduction in symptoms, he would benefit from DDN in attempt to reduce muscular tension and noted trigger points. Added glute bridge with HS curl and SL RDL this date to address posterior chain strength defects. He would benefit from continued skilled PT at this time.  -NINFA            PT Plan    PT Plan Comments Consider DDN. Continue gentle glute med activation in shortened range.  -NINFA           User Key  (r) = Recorded By, (t) = Taken By, (c) = Cosigned By    Initials Name Provider Type    Melba King, PT Physical Therapist                   OP Exercises     Row Name 21 1200             Subjective Comments    Subjective Comments i dont really feel like much of a change from last session. I am interested in DDN  -NINFA              Subjective Pain    Able to rate subjective pain? yes  -NINFA      Pre-Treatment Pain Level 3  -NINFA      Subjective Pain Comment Arrives 1222 for 1215 appt  -NINFA              Total Minutes    54984 - PT Therapeutic  Exercise Minutes 30  -NINFA      77086 - PT Manual Therapy Minutes 8  -NINFA              Exercise 1    Exercise Name 1 LTR  -NINFA      Cueing 1 Verbal; Demo  -NIFNA      Reps 1 10  -NINFA      Time 1 5s  -NINFA              Exercise 2    Exercise Name 2 piriformis stretch  -NINFA      Cueing 2 Verbal; Demo  -NINFA      Reps 2 3  -NINFA      Time 2 20 s  -NINFA      Additional Comments figure 4  -NINFA              Exercise 3    Exercise Name 3 PPT  -NINFA      Cueing 3 Verbal; Demo  -NINFA      Reps 3 10  -NINFA      Time 3 5sec  -NINFA              Exercise 4    Exercise Name 4 seated HS stretch  -NINFA      Cueing 4 Verbal; Demo  -NINFA      Reps 4 3  -NINFA      Time 4 20 s  -NINFA              Exercise 5    Exercise Name 5 lateral prayer stretch  -NINFA      Cueing 5 Verbal; Demo  -NINFA      Reps 5 3  -NINFA      Time 5 20 s  -NINFA              Exercise 7    Exercise Name 7 rec bike  -NINFA      Cueing 7 Verbal; Demo  -NINFA      Time 7 5 min  -NINFA              Exercise 8    Exercise Name 8 bridge to HS curl  -NINFA      Cueing 8 Verbal; Tactile  -NINFA      Reps 8 15  -NINFA      Time 8 set with PPT  -NINFA      Additional Comments progressed from fig 4 bridge  -NINFA              Exercise 9    Exercise Name 9 plank  -NINFA      Cueing 9 Verbal; Demo  -NINFA      Reps 9 3  -NINFA      Time 9 30sec  -NINFA      Additional Comments cues for form  -NINFA              Exercise 10    Exercise Name 10 side plank  -NINFA      Cueing 10 Verbal; Demo  -NINFA      Reps 10 3e  -NINFA      Time 10 30sec  -NINFA      Additional Comments cues for form  -NINFA              Exercise 11    Exercise Name 11 --  -NINFA      Cueing 11 --  -NINFA      Reps 11 --  -NINFA      Time 11 --  -NINFA              Exercise 12    Exercise Name 12 SL RDL  -NINFA      Cueing 12 Verbal; Demo  -NINFA      Reps 12 10  -NINFA      Additional Comments cues for small range  -NINFA            User Key  (r) = Recorded By, (t) = Taken By, (c) = Cosigned By    Initials Name Provider Type    Mleba King, PT Physical Therapist                         Manual Rx (last 36 hours)      Manual Treatments     Row Name 12/28/21 1200             Total Minutes    85557 - PT Manual Therapy Minutes 8  -NINFA              Manual Rx 1    Manual Rx 1 Location STM L lateral hip girdle/piriformis  -      Manual Rx 1 Type with PT elbow  -            User Key  (r) = Recorded By, (t) = Taken By, (c) = Cosigned By    Initials Name Provider Type    Melba King, PT Physical Therapist                 PT OP Goals     Row Name 12/28/21 1200          PT Short Term Goals    STG Date to Achieve 01/14/22  -     STG 1 pt. to be I with initial HEP to facilitate self management of their condition  -     STG 1 Progress Ongoing  -     STG 2 pt. to be educated in/verbalize understanding of the importance of posture/ergonomics in association with their condition to facilitate self management of their condition  -     STG 2 Progress Ongoing  -            Long Term Goals    LTG Date to Achieve 03/18/22  -     LTG 1 pt. to be I with advanced HEP to facilitate self management of their condition  -     LTG 1 Progress Ongoing  -     LTG 2 pt. to report anOswestry </= 8% to demonstrate decreased level of perceived disability  -     LTG 2 Progress Ongoing  -     LTG 3 pt to report absence of L sided low back pain to facilitate ease with performing work/fitness activities  -     LTG 3 Progress Ongoing  -     LTG 4 pt to demonstrate L spine AROM extension without L sided LBP to facilitate ease with performing daily activities  -     LTG 4 Progress Ongoing  -           User Key  (r) = Recorded By, (t) = Taken By, (c) = Cosigned By    Initials Name Provider Type    Melba King, PT Physical Therapist                               Time Calculation:   Start Time: 1222  Stop Time: 1300  Time Calculation (min): 38 min  Timed Charges  41831 - PT Therapeutic Exercise Minutes: 30  97213 - PT Manual Therapy Minutes: 8  Total Minutes  Timed Charges Total Minutes: 38   Total Minutes:  38  Therapy Charges for Today     Code Description Service Date Service Provider Modifiers Qty    39082606517  PT THER PROC EA 15 MIN 12/28/2021 Melba Mayfield, PT GP 2    [id number]  PT MANUAL THERAPY EA 15 MIN 12/28/2021 Melba Mayfield, PT GP 1                    Melba Mayfield, PT  12/28/2021

## 2022-01-03 ENCOUNTER — HOSPITAL ENCOUNTER (OUTPATIENT)
Dept: PHYSICAL THERAPY | Facility: HOSPITAL | Age: 37
Setting detail: THERAPIES SERIES
Discharge: HOME | End: 2022-01-03

## 2022-01-03 DIAGNOSIS — Z74.09 IMPAIRED MOBILITY: ICD-10-CM

## 2022-01-03 DIAGNOSIS — M54.50 LOW BACK PAIN, UNSPECIFIED BACK PAIN LATERALITY, UNSPECIFIED CHRONICITY, UNSPECIFIED WHETHER SCIATICA PRESENT: Primary | ICD-10-CM

## 2022-01-03 NOTE — THERAPY TREATMENT NOTE
Outpatient Physical Therapy Ortho Treatment Note  [facility]     Patient Name: Keon Guzman  : 1985  MRN: [id number]  Today's Date: 1/3/2022      Visit Date: 2022    Visit Dx:    ICD-10-CM ICD-9-CM   1. Low back pain, unspecified back pain laterality, unspecified chronicity, unspecified whether sciatica present  M54.50 724.2   2. Impaired mobility  Z74.09 799.89       Patient Active Problem List   Diagnosis   • Chronic left-sided low back pain with left-sided sciatica   • Lateral epicondylitis of right elbow        No past medical history on file.     No past surgical history on file.                     PT Assessment/Plan     Row Name 2242          PT Assessment    Assessment Comments Mr. Guzman returns, reporting no real change in his condition. He wished to proceed with dry needling today. Following written/verbal consent, we proceeded with dry needling of his L posterior hip girdle tissue. He demosntrated multiple moderate to largel LTRs and noted feeling looser following DDN. Mr. Guzman continues to be a candidate for skilled physical therapy  -GJ            PT Plan    PT Plan Comments assess response to DDN of L hip girdle tissue. DDN as appropriate vs. focus on hip girle strengthening, dynamic strengthening, especially abductors/extensors  -GJ           User Key  (r) = Recorded By, (t) = Taken By, (c) = Cosigned By    Initials Name Provider Type    Alpesh Guevara, PT Physical Therapist                   OP Exercises     Row Name 22             Subjective Comments    Subjective Comments lets try the needling, nothing has really changed yet  -GJ              Subjective Pain    Pre-Treatment Pain Level 7  -GJ            User Key  (r) = Recorded By, (t) = Taken By, (c) = Cosigned By    Initials Name Provider Type    Alpesh Guevara, PT Physical Therapist                         Manual Rx (last 36 hours)     Manual Treatments     Row Name 22              Manual Rx 2    Manual Rx 2 Location intramuscular manual therapy  -GJ Assessed pt. for Dry Needling and intramuscular manual therapy. Discussed risks/benefits of Dry Needling with pt, including but not limited to muscle soreness, bruising, vasovagal response, pneumothorax, nerve injury. Patient signed written consent to proceed with treatment.    Patient position during treatment: R SL with pillow between knees  Muscles treated: L posterior hip girdle tissue  Response to treatment: multiple moderate to large LTR's. Pt reports feeling looser following DDN. No immediate adverse response.     palpation used before, during, and after to facilitate assessment Clean needle technique observed at all times, precautions for lung fields, neurovascular structures observed.    DDN performed by Alpesh Ramírez II, PT, DPT     Manual Rx 2 Type L posterior hip girdle tissue  -GJ            User Key  (r) = Recorded By, (t) = Taken By, (c) = Cosigned By    Initials Name Provider Type    GJ Alpesh Ramírez, PT Physical Therapist                 PT OP Goals     Row Name 01/03/22 0900          PT Short Term Goals    STG Date to Achieve 01/14/22  -GJ     STG 1 pt. to be I with initial HEP to facilitate self management of their condition  -GJ     STG 1 Progress Met  -GJ     STG 2 pt. to be educated in/verbalize understanding of the importance of posture/ergonomics in association with their condition to facilitate self management of their condition  -GJ     STG 2 Progress Met  -            Long Term Goals    LTG Date to Achieve 03/18/22  -GJ     LTG 1 pt. to be I with advanced HEP to facilitate self management of their condition  -GJ     LTG 1 Progress Ongoing  -GJ     LTG 2 pt. to report anOswestry </= 8% to demonstrate decreased level of perceived disability  -GJ     LTG 2 Progress Ongoing  -GJ     LTG 3 pt to report absence of L sided low back pain to facilitate ease with performing work/fitness activities  -GJ     LTG 3  Progress Ongoing  -     LTG 4 pt to demonstrate L spine AROM extension without L sided LBP to facilitate ease with performing daily activities  -     LTG 4 Progress Ongoing  -           User Key  (r) = Recorded By, (t) = Taken By, (c) = Cosigned By    Initials Name Provider Type    Alpesh Guevara, PT Physical Therapist                Therapy Education  Education Details: discussed risks/benefits of DDN and it's role in therapy as an adjunct therapy and not a stand alone treatment  Given: HEP, Symptoms/condition management, Pain management, Posture/body mechanics, Mobility training  How Provided: Verbal  Provided to: Patient  Level of Understanding: Verbalized              Time Calculation:   Start Time: 0919  Stop Time: 0950  Time Calculation (min): 31 min  Therapy Charges for Today     Code Description Service Date Service Provider Modifiers Qty    74486519323  PT SELF PAY DRY NEEDLING SESSION 1/3/2022 Alpesh Ramírez, PT  1                    Alpesh Ramírez, PT  1/3/2022

## 2022-01-10 ENCOUNTER — HOSPITAL ENCOUNTER (OUTPATIENT)
Dept: PHYSICAL THERAPY | Facility: HOSPITAL | Age: 37
Setting detail: THERAPIES SERIES
Discharge: HOME | End: 2022-01-10

## 2022-01-10 DIAGNOSIS — Z74.09 IMPAIRED MOBILITY: ICD-10-CM

## 2022-01-10 DIAGNOSIS — M54.50 LOW BACK PAIN, UNSPECIFIED BACK PAIN LATERALITY, UNSPECIFIED CHRONICITY, UNSPECIFIED WHETHER SCIATICA PRESENT: Primary | ICD-10-CM

## 2022-01-10 PROCEDURE — 97110 THERAPEUTIC EXERCISES: CPT

## 2022-01-10 NOTE — THERAPY TREATMENT NOTE
Outpatient Physical Therapy Ortho Treatment Note  [facility]     Patient Name: Keon Guzman  : 1985  MRN: [id number]  Today's Date: 1/10/2022      Visit Date: 01/10/2022    Visit Dx:    ICD-10-CM ICD-9-CM   1. Low back pain, unspecified back pain laterality, unspecified chronicity, unspecified whether sciatica present  M54.50 724.2   2. Impaired mobility  Z74.09 799.89       Patient Active Problem List   Diagnosis   • Chronic left-sided low back pain with left-sided sciatica   • Lateral epicondylitis of right elbow        No past medical history on file.     No past surgical history on file.                     PT Assessment/Plan     Row Name 01/10/22 1118          PT Assessment    Assessment Comments Mr. Guzman returns for his 5th visit with no change in symptoms.  Pain level is 5/10 with t.p. at L supior glut/S.I. region.  Pt got no relief from DDN session.  He does present with stiffness of  L PSIS, with flex/ext of L spine in standing, and more pronounced with ext.  This motion did not provocate pain, but was tender.  He also continues with tight Hamstrings. Pt demonstrates good PPT with good lower abdominal engagement and tolerates all progressed core exercises without increased pain.  -LP     Please refer to paper survey for additional self-reported information Yes  -LP     Rehab Potential Fair  -LP     Patient/caregiver participated in establishment of treatment plan and goals Yes  -LP     Patient would benefit from skilled therapy intervention Yes  -LP            PT Plan    PT Plan Comments Pt does not want to continue PT at this time.  He was going to maybe seek an Ortho consult.  Was given instructions to continue stretching and core exercises.  -LP           User Key  (r) = Recorded By, (t) = Taken By, (c) = Cosigned By    Initials Name Provider Type    LP Tiff Castañeda, PT Physical Therapist                   OP Exercises     Row Name 01/10/22 0900             Subjective Comments     Subjective Comments sore for 2 days after and then back pain returned at same level  -LP              Subjective Pain    Able to rate subjective pain? yes  -LP      Pre-Treatment Pain Level 5  -LP              Total Minutes    24401 - PT Therapeutic Exercise Minutes 30  -LP              Exercise 1    Exercise Name 1 LTR  -LP      Cueing 1 Verbal  -LP      Reps 1 10  -LP      Time 1 5s  -LP              Exercise 2    Exercise Name 2 piriformis stretch  -LP      Cueing 2 Verbal  -LP      Reps 2 3  -LP      Time 2 20s  -LP              Exercise 3    Exercise Name 3 PPT  -LP      Cueing 3 Verbal; Demo  -LP      Sets 3 2  -LP      Reps 3 10  -LP              Exercise 4    Exercise Name 4 90/90 HS  -LP      Cueing 4 Verbal  -LP      Reps 4 3  -LP      Time 4 30s  -LP      Additional Comments with sheet.  -LP              Exercise 5    Exercise Name 5 lateral prayer stretch  -LP      Cueing 5 Verbal  -LP      Reps 5 3  -LP      Time 5 20s  -LP      Additional Comments with SB to R  -LP              Exercise 7    Exercise Name 7 Supine PPT with leg ext, supported and unsupported  -LP      Cueing 7 Verbal; Tactile; Demo  -LP      Sets 7 1  -LP      Reps 7 12  -LP      Time 7 ea  -LP              Exercise 8    Exercise Name 8 bridge to HS curl  -LP      Cueing 8 Verbal; Tactile  -LP      Reps 8 15  -LP      Additional Comments also 10 with ball b/w knees  -LP              Exercise 9    Exercise Name 9 plank  -LP      Cueing 9 Verbal; Demo  -LP      Reps 9 2  -LP      Time 9 30s  -LP              Exercise 10    Exercise Name 10 side plank  -LP      Cueing 10 Verbal; Demo  -LP      Reps 10 2  -LP      Time 10 30s  -LP              Exercise 11    Exercise Name 11 Prone Leg ext with pelvic tilt  -LP      Cueing 11 Verbal; Demo  -LP      Reps 11 10ea  -LP              Exercise 12    Exercise Name 12 Q-ped bird dog  -LP      Cueing 12 Verbal; Demo  -LP      Reps 12 10  -LP            User Key  (r) = Recorded By, (t) = Taken By,  (c) = Cosigned By    Initials Name Provider Type    LP Tiff Castañeda, PT Physical Therapist                                 Therapy Education  Education Details: core strengthening concepts.  Stretching more frequently/taking more breaks when driving across states.  Given: HEP, Symptoms/condition management, Posture/body mechanics  Program: New, Reinforced  How Provided: Verbal, Demonstration, Written  Provided to: Patient  Level of Understanding: Teach back education performed              Time Calculation:   Start Time: 0915  Stop Time: 1000  Time Calculation (min): 45 min  Timed Charges  49832 - PT Therapeutic Exercise Minutes: 30  Total Minutes  Timed Charges Total Minutes: 30   Total Minutes: 30  Therapy Charges for Today     Code Description Service Date Service Provider Modifiers Qty    16428574603 HC PT THER PROC EA 15 MIN 1/10/2022 Tiff Castañeda, PT GP 2                    Tiff Castañeda PT  1/10/2022

## 2022-07-27 DIAGNOSIS — M54.42 CHRONIC LEFT-SIDED LOW BACK PAIN WITH LEFT-SIDED SCIATICA: Primary | ICD-10-CM

## 2022-07-27 DIAGNOSIS — G89.29 CHRONIC LEFT-SIDED LOW BACK PAIN WITH LEFT-SIDED SCIATICA: Primary | ICD-10-CM

## 2022-08-10 ENCOUNTER — HOSPITAL ENCOUNTER (OUTPATIENT)
Dept: MRI IMAGING | Facility: HOSPITAL | Age: 37
End: 2022-08-10

## 2022-08-15 ENCOUNTER — LAB (OUTPATIENT)
Dept: LAB | Facility: HOSPITAL | Age: 37
End: 2022-08-15

## 2022-08-15 ENCOUNTER — OFFICE VISIT (OUTPATIENT)
Dept: INTERNAL MEDICINE | Facility: CLINIC | Age: 37
End: 2022-08-15

## 2022-08-15 VITALS
DIASTOLIC BLOOD PRESSURE: 68 MMHG | WEIGHT: 171.2 LBS | BODY MASS INDEX: 25.36 KG/M2 | OXYGEN SATURATION: 100 % | SYSTOLIC BLOOD PRESSURE: 102 MMHG | HEART RATE: 61 BPM | HEIGHT: 69 IN

## 2022-08-15 DIAGNOSIS — Z00.00 HEALTHCARE MAINTENANCE: ICD-10-CM

## 2022-08-15 DIAGNOSIS — Z00.00 VISIT FOR WELL MAN HEALTH CHECK: Primary | ICD-10-CM

## 2022-08-15 DIAGNOSIS — Z23 NEED FOR TDAP VACCINATION: ICD-10-CM

## 2022-08-15 LAB
25(OH)D3 SERPL-MCNC: 87.4 NG/ML (ref 30–100)
ALBUMIN SERPL-MCNC: 4.5 G/DL (ref 3.5–5.2)
ALBUMIN/GLOB SERPL: 2.3 G/DL
ALP SERPL-CCNC: 39 U/L (ref 39–117)
ALT SERPL W P-5'-P-CCNC: 20 U/L (ref 1–41)
ANION GAP SERPL CALCULATED.3IONS-SCNC: 9 MMOL/L (ref 5–15)
AST SERPL-CCNC: 30 U/L (ref 1–40)
BASOPHILS # BLD AUTO: 0.01 10*3/MM3 (ref 0–0.2)
BASOPHILS NFR BLD AUTO: 0.2 % (ref 0–1.5)
BILIRUB SERPL-MCNC: 1 MG/DL (ref 0–1.2)
BUN SERPL-MCNC: 30 MG/DL (ref 6–20)
BUN/CREAT SERPL: 24.8 (ref 7–25)
CALCIUM SPEC-SCNC: 9.7 MG/DL (ref 8.6–10.5)
CHLORIDE SERPL-SCNC: 103 MMOL/L (ref 98–107)
CHOLEST SERPL-MCNC: 197 MG/DL (ref 0–200)
CO2 SERPL-SCNC: 26 MMOL/L (ref 22–29)
CREAT SERPL-MCNC: 1.21 MG/DL (ref 0.76–1.27)
DEPRECATED RDW RBC AUTO: 37.5 FL (ref 37–54)
EGFRCR SERPLBLD CKD-EPI 2021: 80.1 ML/MIN/1.73
EOSINOPHIL # BLD AUTO: 0.43 10*3/MM3 (ref 0–0.4)
EOSINOPHIL NFR BLD AUTO: 9 % (ref 0.3–6.2)
ERYTHROCYTE [DISTWIDTH] IN BLOOD BY AUTOMATED COUNT: 11.6 % (ref 12.3–15.4)
GLOBULIN UR ELPH-MCNC: 2 GM/DL
GLUCOSE SERPL-MCNC: 89 MG/DL (ref 65–99)
HBA1C MFR BLD: 4.9 % (ref 4.8–5.6)
HCT VFR BLD AUTO: 47.4 % (ref 37.5–51)
HDLC SERPL-MCNC: 52 MG/DL (ref 40–60)
HGB BLD-MCNC: 16 G/DL (ref 13–17.7)
IMM GRANULOCYTES # BLD AUTO: 0.01 10*3/MM3 (ref 0–0.05)
IMM GRANULOCYTES NFR BLD AUTO: 0.2 % (ref 0–0.5)
LDLC SERPL CALC-MCNC: 127 MG/DL (ref 0–100)
LDLC/HDLC SERPL: 2.41 {RATIO}
LYMPHOCYTES # BLD AUTO: 1.25 10*3/MM3 (ref 0.7–3.1)
LYMPHOCYTES NFR BLD AUTO: 26.1 % (ref 19.6–45.3)
MCH RBC QN AUTO: 30.3 PG (ref 26.6–33)
MCHC RBC AUTO-ENTMCNC: 33.8 G/DL (ref 31.5–35.7)
MCV RBC AUTO: 89.8 FL (ref 79–97)
MONOCYTES # BLD AUTO: 0.4 10*3/MM3 (ref 0.1–0.9)
MONOCYTES NFR BLD AUTO: 8.4 % (ref 5–12)
NEUTROPHILS NFR BLD AUTO: 2.69 10*3/MM3 (ref 1.7–7)
NEUTROPHILS NFR BLD AUTO: 56.1 % (ref 42.7–76)
NRBC BLD AUTO-RTO: 0 /100 WBC (ref 0–0.2)
PLATELET # BLD AUTO: 186 10*3/MM3 (ref 140–450)
PMV BLD AUTO: 10.4 FL (ref 6–12)
POTASSIUM SERPL-SCNC: 5.4 MMOL/L (ref 3.5–5.2)
PROT SERPL-MCNC: 6.5 G/DL (ref 6–8.5)
RBC # BLD AUTO: 5.28 10*6/MM3 (ref 4.14–5.8)
SODIUM SERPL-SCNC: 138 MMOL/L (ref 136–145)
T-UPTAKE NFR SERPL: 1.06 TBI (ref 0.8–1.3)
T4 SERPL-MCNC: 5.01 MCG/DL (ref 4.5–11.7)
TRIGL SERPL-MCNC: 98 MG/DL (ref 0–150)
TSH SERPL DL<=0.05 MIU/L-ACNC: 1.12 UIU/ML (ref 0.27–4.2)
VLDLC SERPL-MCNC: 18 MG/DL (ref 5–40)
WBC NRBC COR # BLD: 4.79 10*3/MM3 (ref 3.4–10.8)

## 2022-08-15 PROCEDURE — 80061 LIPID PANEL: CPT | Performed by: FAMILY MEDICINE

## 2022-08-15 PROCEDURE — 90715 TDAP VACCINE 7 YRS/> IM: CPT | Performed by: FAMILY MEDICINE

## 2022-08-15 PROCEDURE — 90471 IMMUNIZATION ADMIN: CPT | Performed by: FAMILY MEDICINE

## 2022-08-15 PROCEDURE — 99395 PREV VISIT EST AGE 18-39: CPT | Performed by: FAMILY MEDICINE

## 2022-08-15 PROCEDURE — 82306 VITAMIN D 25 HYDROXY: CPT | Performed by: FAMILY MEDICINE

## 2022-08-15 PROCEDURE — 84479 ASSAY OF THYROID (T3 OR T4): CPT | Performed by: FAMILY MEDICINE

## 2022-08-15 PROCEDURE — 83036 HEMOGLOBIN GLYCOSYLATED A1C: CPT | Performed by: FAMILY MEDICINE

## 2022-08-15 PROCEDURE — 36415 COLL VENOUS BLD VENIPUNCTURE: CPT | Performed by: FAMILY MEDICINE

## 2022-08-15 PROCEDURE — 80050 GENERAL HEALTH PANEL: CPT | Performed by: FAMILY MEDICINE

## 2022-08-15 PROCEDURE — 84436 ASSAY OF TOTAL THYROXINE: CPT | Performed by: FAMILY MEDICINE

## 2022-08-15 RX ORDER — ITRACONAZOLE 10 MG/ML
200 SOLUTION ORAL DAILY
COMMUNITY

## 2022-08-15 NOTE — PROGRESS NOTES
Subjective   Keon Guzman is a 37 y.o. male and is here for a comprehensive physical exam. The patient reports no problems.            Social History:   Social History     Socioeconomic History   • Marital status:    Tobacco Use   • Smoking status: Never Smoker   • Smokeless tobacco: Never Used   Vaping Use   • Vaping Use: Never used   Substance and Sexual Activity   • Alcohol use: Never   • Drug use: Never       Family History: No family history on file.    Past Medical History: No past medical history on file.    The following portions of the patient's history were reviewed and updated as appropriate: allergies, current medications, past family history, past medical history, past social history, past surgical history and problem list.    Review of Systems    Review of Systems   Constitutional: Negative for chills and fever.   HENT: Negative for congestion, rhinorrhea, sinus pain and sore throat.    Eyes: Negative for photophobia and visual disturbance.   Respiratory: Negative for cough, chest tightness and shortness of breath.    Cardiovascular: Negative for chest pain and palpitations.   Gastrointestinal: Negative for diarrhea, nausea and vomiting.   Genitourinary: Negative for dysuria, frequency and urgency.   Skin: Negative for rash and wound.   Neurological: Negative for dizziness and syncope.   Psychiatric/Behavioral: Negative for behavioral problems and confusion.       Objective   Physical Exam  Vitals and nursing note reviewed.   Constitutional:       Appearance: He is well-developed.   HENT:      Head: Normocephalic and atraumatic.      Right Ear: External ear normal.      Left Ear: External ear normal.   Cardiovascular:      Rate and Rhythm: Normal rate and regular rhythm.      Heart sounds: Normal heart sounds.   Pulmonary:      Effort: Pulmonary effort is normal. No respiratory distress.      Breath sounds: Normal breath sounds.   Abdominal:      Palpations: Abdomen is soft.       Tenderness: There is no abdominal tenderness. There is no guarding.   Musculoskeletal:         General: Normal range of motion.      Cervical back: Normal range of motion and neck supple.   Lymphadenopathy:      Cervical: No cervical adenopathy.   Skin:     General: Skin is warm.   Neurological:      Mental Status: He is alert and oriented to person, place, and time.   Psychiatric:         Behavior: Behavior normal.         Vitals:    08/15/22 0816   BP: 102/68   Pulse: 61   SpO2: 100%     Body mass index is 25.28 kg/m².    Medications:   Current Outpatient Medications:   •  itraconazole (SPORANOX) 10 MG/ML solution, Take 200 mg by mouth Daily., Disp: , Rfl:        Assessment & Plan   Healthy male exam.      1. Healthcare Maintenance:  2. Patient Counseling:  --Nutrition: Stressed importance of moderation in sodium/caffeine intake, saturated fat and cholesterol, caloric balance, sufficient intake of fresh fruits, vegetables, fiber, calcium and vit D  --Exercise: Recommended 30 minutes of exercise daily.   --Immunizations reviewed.      Diagnoses and all orders for this visit:    Visit for [facility] health check    Healthcare maintenance  -     CBC & Differential  -     Comprehensive Metabolic Panel  -     Hemoglobin A1c  -     Thyroid Panel With TSH  -     Lipid Panel With LDL / HDL Ratio  -     Vitamin D 25 Hydroxy    Need for Tdap vaccination  -     Tdap Vaccine Greater Than or Equal To 8yo IM    Other orders  -     itraconazole (SPORANOX) 10 MG/ML solution; Take 200 mg by mouth Daily.        No follow-ups on file.           Dictated utilizing Dragon Voice Recognition Software

## 2022-08-20 DIAGNOSIS — E87.5 HYPERKALEMIA: Primary | ICD-10-CM

## 2022-08-21 NOTE — PROGRESS NOTES
Please inform the patient of the following abnormal results. Elevated potassium. Repeat bmp sometime this week. Orders are placed.

## 2022-08-23 ENCOUNTER — HOSPITAL ENCOUNTER (OUTPATIENT)
Dept: MRI IMAGING | Facility: HOSPITAL | Age: 37
Discharge: HOME | End: 2022-08-23
Admitting: FAMILY MEDICINE

## 2022-08-23 DIAGNOSIS — M54.42 CHRONIC LEFT-SIDED LOW BACK PAIN WITH LEFT-SIDED SCIATICA: ICD-10-CM

## 2022-08-23 DIAGNOSIS — G89.29 CHRONIC LEFT-SIDED LOW BACK PAIN WITH LEFT-SIDED SCIATICA: ICD-10-CM

## 2022-08-23 PROCEDURE — 73221 MRI JOINT UPR EXTREM W/O DYE: CPT

## 2022-08-24 ENCOUNTER — LAB (OUTPATIENT)
Dept: LAB | Facility: HOSPITAL | Age: 37
End: 2022-08-24

## 2022-08-24 DIAGNOSIS — E87.5 HYPERKALEMIA: ICD-10-CM

## 2022-08-24 LAB
ANION GAP SERPL CALCULATED.3IONS-SCNC: 7.4 MMOL/L (ref 5–15)
BUN SERPL-MCNC: 20 MG/DL (ref 6–20)
BUN/CREAT SERPL: 18.3 (ref 7–25)
CALCIUM SPEC-SCNC: 9.5 MG/DL (ref 8.6–10.5)
CHLORIDE SERPL-SCNC: 103 MMOL/L (ref 98–107)
CO2 SERPL-SCNC: 29.6 MMOL/L (ref 22–29)
CREAT SERPL-MCNC: 1.09 MG/DL (ref 0.76–1.27)
EGFRCR SERPLBLD CKD-EPI 2021: 90.8 ML/MIN/1.73
GLUCOSE SERPL-MCNC: 73 MG/DL (ref 65–99)
POTASSIUM SERPL-SCNC: 4.2 MMOL/L (ref 3.5–5.2)
SODIUM SERPL-SCNC: 140 MMOL/L (ref 136–145)

## 2022-08-24 PROCEDURE — 80048 BASIC METABOLIC PNL TOTAL CA: CPT

## 2022-08-24 PROCEDURE — 36415 COLL VENOUS BLD VENIPUNCTURE: CPT

## 2022-08-25 DIAGNOSIS — M25.521 RIGHT ELBOW PAIN: Primary | ICD-10-CM

## 2022-08-25 NOTE — PROGRESS NOTES
Please inform the patient of the following abnormal results.     IMPRESSION:  Large partial-thickness interstitial tear at the right common extensor tendon origin.    Will like to refer to orthopedics. Needs further evaluation.